# Patient Record
Sex: FEMALE | Race: WHITE | NOT HISPANIC OR LATINO | URBAN - METROPOLITAN AREA
[De-identification: names, ages, dates, MRNs, and addresses within clinical notes are randomized per-mention and may not be internally consistent; named-entity substitution may affect disease eponyms.]

---

## 2017-02-22 ENCOUNTER — OFFICE VISIT - HEALTHEAST (OUTPATIENT)
Dept: FAMILY MEDICINE | Facility: CLINIC | Age: 32
End: 2017-02-22

## 2017-02-22 DIAGNOSIS — J40 BRONCHITIS: ICD-10-CM

## 2017-02-22 RX ORDER — ALBUTEROL SULFATE 90 UG/1
2 AEROSOL, METERED RESPIRATORY (INHALATION) EVERY 4 HOURS PRN
Qty: 18 G | Refills: 1 | Status: SHIPPED | OUTPATIENT
Start: 2017-02-22 | End: 2022-05-24

## 2017-03-07 ENCOUNTER — OFFICE VISIT - HEALTHEAST (OUTPATIENT)
Dept: FAMILY MEDICINE | Facility: CLINIC | Age: 32
End: 2017-03-07

## 2017-03-07 DIAGNOSIS — B97.89 VIRAL SINUSITIS: ICD-10-CM

## 2017-03-07 DIAGNOSIS — J32.9 VIRAL SINUSITIS: ICD-10-CM

## 2017-05-11 ENCOUNTER — OFFICE VISIT - HEALTHEAST (OUTPATIENT)
Dept: INTERNAL MEDICINE | Facility: CLINIC | Age: 32
End: 2017-05-11

## 2017-05-11 DIAGNOSIS — B37.2 CUTANEOUS CANDIDIASIS: ICD-10-CM

## 2019-01-02 ENCOUNTER — OFFICE VISIT - HEALTHEAST (OUTPATIENT)
Dept: FAMILY MEDICINE | Facility: CLINIC | Age: 34
End: 2019-01-02

## 2019-01-02 DIAGNOSIS — K58.2 IRRITABLE BOWEL SYNDROME WITH BOTH CONSTIPATION AND DIARRHEA: ICD-10-CM

## 2019-01-02 DIAGNOSIS — A08.4 VIRAL GASTROENTERITIS: ICD-10-CM

## 2019-01-02 DIAGNOSIS — K21.9 GASTROESOPHAGEAL REFLUX DISEASE WITHOUT ESOPHAGITIS: ICD-10-CM

## 2019-01-02 RX ORDER — BENZONATATE 100 MG/1
100 CAPSULE ORAL 3 TIMES DAILY PRN
Qty: 30 CAPSULE | Refills: 0 | Status: SHIPPED | OUTPATIENT
Start: 2019-01-02 | End: 2022-05-24

## 2019-01-02 RX ORDER — ONDANSETRON 4 MG/1
4 TABLET, FILM COATED ORAL DAILY PRN
Qty: 30 TABLET | Refills: 1 | Status: SHIPPED | OUTPATIENT
Start: 2019-01-02 | End: 2022-05-24

## 2019-01-02 ASSESSMENT — MIFFLIN-ST. JEOR: SCORE: 1458.21

## 2021-05-30 VITALS — WEIGHT: 173.6 LBS

## 2021-05-30 VITALS — WEIGHT: 173 LBS

## 2021-06-02 VITALS — HEIGHT: 64 IN | BODY MASS INDEX: 29.59 KG/M2 | WEIGHT: 173.3 LBS

## 2021-06-09 NOTE — PROGRESS NOTES
Chief Complaint   Patient presents with     sinus drainage and pressure     x2 weeks sinus drainage pressure , ears hurt face hurts      History of Present Illness:    Mary is a 30 y/o female who is currently breastfeeding who is mainly concerned about sinus tenderness.  The patient reports about 10 day history of sinus tenderness,facial and teeth pain w/o fevers.  She denies any cough, sore throat or hoarseness.  The patient denies any recent sick contacts.  No chest pain, orthopnea, paroxysmal nocturnal dyspnea or lower extremity edema.  She has not received Influenza vaccine this year.  The patient is allergic to penicillins, Macrolides and Cephalosporins.       Review of systems: See history of present illness, otherwise negative.   Current Outpatient Prescriptions   Medication Sig Dispense Refill     albuterol (PROVENTIL HFA;VENTOLIN HFA) 90 mcg/actuation inhaler Inhale 2 puffs every 4 (four) hours as needed for wheezing (or cough). 18 g 1     inhalational spacing device Spcr Use with inhaler as needed 1 each 0     No current facility-administered medications for this visit.       Past Medical History:   Diagnosis Date     GERD (gastroesophageal reflux disease) 11/28/2016     IBS (irritable bowel syndrome) 11/28/2016      Past Surgical History:   Procedure Laterality Date     TONSILLECTOMY        Social History     Social History     Marital status: Single     Spouse name: N/A     Number of children: N/A     Years of education: N/A     Social History Main Topics     Smoking status: Current Every Day Smoker     Smokeless tobacco: Not on file     Alcohol use Not on file     Drug use: Not on file     Sexual activity: Not on file     Other Topics Concern     Not on file     Social History Narrative      History   Smoking Status     Current Every Day Smoker   Smokeless Tobacco     Not on file      Exam:   Blood pressure 112/60, pulse 88, temperature 98.3  F (36.8  C), temperature source Oral, resp. rate 16,  weight 173 lb (78.5 kg), last menstrual period 02/21/2017, SpO2 100 %, not currently breastfeeding.   General: Pleasant female in NAD.  HEENT: Atraumatic, Tympanic membranes clear. Oropharynx clear. No sinus tenderness or facial pain.  NECK: No lymphadenopathy or thyromegaly or JVD.  Respiratory: Clear to auscultation and percussion.  CVS: Regular rate and rhythm without murmur, rub, or gallop.  GI: Normal bowel sounds. Soft, nontender. No hepatosplenomegaly.  EXTREMITIES: No cyanosis, clubbing, or edema. 2+ peripheral pulses.       Assessment/Plan   1. Viral sinusitis        Patient Instructions   Discussed with the patient that his symptoms likely related to viral sinusitis which should improve with symptomatic supportive cares.  She is currently breastfeeding and is also allergic to multiple antibiotic classes(penicillins, Macrolides and Cephalosporins).    We would like to avoid antibiotics at this point and only use if symptoms persist longer or worsen over the next week.  Recommended supportive cares; nasal saline, elevating hob, humidified air.  Advised plenty of fluids and rest. Tylenol prn for fever/discomfort.  Use Albuterol prn.  If symptoms not improved in next 5-7 days, f/u with PCP, sooner if worsening.             Rosas Lew, DO  Internal Medicine

## 2021-06-09 NOTE — PROGRESS NOTES
Assessment/Plan:   Bronchitis  Four days cough, ST and a little bit of fever.  - albuterol (PROVENTIL HFA;VENTOLIN HFA) 90 mcg/actuation inhaler; Inhale 2 puffs every 4 (four) hours as needed for wheezing (or cough).  Dispense: 18 g; Refill: 1  - inhalational spacing device Spcr; Use with inhaler as needed  Dispense: 1 each; Refill: 0    Fluids, rest, steam, nasal saline if congested  Cough drops as needed  Guaifenesin as needed for cough  Albuterol inhaler as needed  Follow up if worse or no better    Subjective:      Mary Zelaya is a 31 y.o. female who presents with cough and fever.  About 4 days ago she developed stuffy nose which progressed into a cough.  Yesterday she developed fever and ST.  ST and cough both worse today.  Her son recently diagnosed with bronchitis and treated with albuterol, getting better.  No other known exposures.  No wheeze or shortness of breath.  No chills, myalgia or fatigue.  Multiple drug allergies.      Allergies   Allergen Reactions     Azithromycin      Cephalexin Monohydrate      Penicillins      No current outpatient prescriptions on file prior to visit.     No current facility-administered medications on file prior to visit.      Patient Active Problem List   Diagnosis     Tendonitis Peroneal     IBS (irritable bowel syndrome)     GERD (gastroesophageal reflux disease)       Objective:     Visit Vitals     /60     Pulse 96     Temp 98.5  F (36.9  C) (Oral)     Resp 12     Wt 173 lb 9.6 oz (78.7 kg)     SpO2 98%       Physical  General Appearance: Alert, cooperative, no distress  Head: Normocephalic, without obvious abnormality, atraumatic  Eyes: Conjunctivae are normal.  Ears: Normal TMs and external ear canals, both ears  Nose: mild congestion, no sinus tenderness.  Throat: Throat is red posteriorly.  No exudate.  No significant lesions  Neck: No adenopathy  Lungs: Clear to auscultation bilaterally, prolonged exp phase and deep breaths trigger cough,  respirations unlabored  Heart: Regular rate and rhythm  Skin:  no rashes or lesions  Psychiatric: Patient has a normal mood and affect.

## 2021-06-10 NOTE — PROGRESS NOTES
[facility] Clinic Note  Patient Name: Mary Zelaya  Patient Age: 31 y.o.  YOB: 1985  MRN: [id number]  ?  Date of Visit: 5/11/2017  Reason for Office Visit: Breast problem     HPI: Mary Zelaya 31 y.o. female who presents to clinic for right breast spots concerning for fungal infection. She is here with her 10 mo old who has thrush. Some itching and pain. No surrounding redness or warmth. Has been breastfeeding and pumping       Review of Systems: As noted in HPI     Current Scheduled Meds:  Outpatient Encounter Prescriptions as of 5/11/2017   Medication Sig Dispense Refill     albuterol (PROVENTIL HFA;VENTOLIN HFA) 90 mcg/actuation inhaler Inhale 2 puffs every 4 (four) hours as needed for wheezing (or cough). 18 g 1     clotrimazole (LOTRIMIN) 1 % cream Apply twice a day to affected area until rash resolves 30 g 0     inhalational spacing device Spcr Use with inhaler as needed 1 each 0     No facility-administered encounter medications on file as of 5/11/2017.        Objective / Physical Examination:  There were no vitals taken for this visit.  Wt Readings from Last 3 Encounters:   03/07/17 173 lb (78.5 kg)   02/22/17 173 lb 9.6 oz (78.7 kg)   11/28/16 174 lb 3.2 oz (79 kg)     There is no height or weight on file to calculate BMI. (>25?)    General Appearance: Alert and oriented in no acute distress  Integumentary: scattered erythematous spots consistent with candida around right areola    Assessment / Plan / Medical Decision Making:      Encounter Diagnoses   Name Primary?     Cutaneous candidiasis Yes        1. Cutaneous candidiasis    Rash suspicious for cutaneous candida infection. Advise clotrimazole twice daily. Pump on that side. Avoid breast feeding until rash clears.     - clotrimazole (LOTRIMIN) 1 % cream; Apply twice a day to affected area until rash resolves  Dispense: 30 g; Refill: 0    Total time spent with patient was 10 minutes with >50% of time spent in  face-to-face counseling regarding the above plan     Porfirio Vargas MD  [facility]

## 2021-06-18 NOTE — LETTER
Letter by Kath Dorantes MD at      Author: Kath Dorantes MD Service: -- Author Type: --    Filed:  Encounter Date: 1/2/2019 Status: (Other)       January 2, 2019     Patient: Mary Zelaya   YOB: 1985   Date of Visit: 1/2/2019       To Whom It May Concern:    It is my medical opinion that Mary Zelaya may return to work on 1/7/18.  If symptoms stop, can return to work    If you have any questions or concerns, please don't hesitate to call.    Sincerely,        Electronically signed by Kath Dorantes MD

## 2021-06-22 NOTE — PROGRESS NOTES
Family Medicine Office Visit  [facility]  Patient Name: Mary Zelaya  Patient Age: 33 y.o.  YOB: 1985  MRN: [id number]    Date of Visit: 2019  Reason for Office Visit:   Chief Complaint   Patient presents with     Establish Care     1 day vomiting, diarrhea           Assessment / Plan / Medical Decision Makin. Irritable bowel syndrome with both constipation and diarrhea  Diarrhea but do not recommend any bowel medications including lomotil due to hx of IBS    2. Gastroesophageal reflux disease without esophagitis  Continue with current medications    3. Viral gastroenteritis  Will do zofran for nausea and recommend continue with good PO intake.  If any worsening symtpoms, return to the clinic.      Health Maintenance Review  Health Maintenance   Topic Date Due     TD 18+ HE  2001     TDAP ADULT ONE TIME DOSE  2001     ADVANCE DIRECTIVES DISCUSSED WITH PATIENT  2001     PAP SMEAR  2013     INFLUENZA VACCINE RULE BASED (1) 2018         I have discontinued Mary Zelaya's clotrimazole. I am also having her start on ondansetron and benzonatate. Additionally, I am having her maintain her albuterol and inhalational spacing device.      HPI:  Mary Zelaya is a 33 y.o. year old who presents to the office today for diarrhea and vomiting that started today.  Vomiting and diarrhea about 5-6 times this morning.  Trying to keep water down but threw that up about 1 hour ago.  Hx of IBS and feels like that is flared up due to the diarrhea.  Prior to this illness, viral URI in December and missing a bunch of work.  No fevers or chills        Review of Systems- pertinent positive in bold:  Constitutional: Fever, chills, night sweats, fainting, weight change, fatigue, seizures, dizziness, sleeping difficulties, loud snoring/pauses in breathing  Eyes: change in vision, blurred or double vision, redness/eye  pain  Ears, nose, mouth, throat: change in hearing, ear pain, hoarseness, difficulty swallowing, sores in the mouth or throat  Respiratory: shortness of breath, cough, bloody sputum, wheezing  Cardiovascular: chest pain, palpitations   Gastrointestinal: abdominal pain, heartburn/indigestion, nausea/vomiting, change in appetite, change in bowel habits, constipation or diarrhea, rectal bleeding/dark stools, difficulty swallowing  Urinary: painful urination, frequent urination, urinary urgency/incontinence, blood in urine/dark urine, nocturia  Musculoskeletal: backache/back pain (new or increasing), weakness, joint pain/stiffness (new or increasing), muscle cramps, swelling of hands, feet, ankles, leg pain/redness  Skin: change in moles/freckles, rash, nodules  Hematologic/lymphatic: swollen lymph glands, abnormal bruising/bleeding  Endocrine: excessive thirst/urination, cold or heat intolerance  Neurologic/emotional: worrisome memory change, numbness/tingling, anxiety, mood swings      Current Scheduled Meds:  Outpatient Encounter Medications as of 1/2/2019   Medication Sig Dispense Refill     albuterol (PROVENTIL HFA;VENTOLIN HFA) 90 mcg/actuation inhaler Inhale 2 puffs every 4 (four) hours as needed for wheezing (or cough). 18 g 1     inhalational spacing device Spcr Use with inhaler as needed 1 each 0     benzonatate (TESSALON PERLES) 100 MG capsule Take 1 capsule (100 mg total) by mouth 3 (three) times a day as needed for cough. 30 capsule 0     ondansetron (ZOFRAN) 4 MG tablet Take 1 tablet (4 mg total) by mouth daily as needed for nausea. 30 tablet 1     [DISCONTINUED] clotrimazole (LOTRIMIN) 1 % cream Apply twice a day to affected area until rash resolves 30 g 0     No facility-administered encounter medications on file as of 1/2/2019.      Past Medical History:   Diagnosis Date     Anxiety      Depression      GERD (gastroesophageal reflux disease) 11/28/2016     IBS (irritable bowel syndrome) 11/28/2016  "    Past Surgical History:   Procedure Laterality Date     ADENOIDECTOMY       TONSILLECTOMY       TYMPANOSTOMY       Social History     Tobacco Use     Smoking status: Current Every Day Smoker     Packs/day: 0.50     Years: 20.00     Pack years: 10.00     Smokeless tobacco: Never Used   Substance Use Topics     Alcohol use: No     Frequency: Never     Drug use: No       Objective / Physical Examination:  Vitals:    01/02/19 0848   BP: 100/80   Patient Site: Right Arm   Patient Position: Sitting   Cuff Size: Adult Large   Pulse: 98   Resp: 26   SpO2: 97%   Weight: 173 lb 4.8 oz (78.6 kg)   Height: 5' 4.13\" (1.629 m)     Wt Readings from Last 3 Encounters:   01/02/19 173 lb 4.8 oz (78.6 kg)   03/07/17 173 lb (78.5 kg)   02/22/17 173 lb 9.6 oz (78.7 kg)     BP Readings from Last 3 Encounters:   01/02/19 100/80   03/07/17 112/60   02/22/17 112/60     Body mass index is 29.62 kg/m .     General Appearance: Alert and oriented, cooperative, affect appropriate, speech clear, in no apparent distress  Head: Normocephalic, atraumatic  Ears: Tympanic membrane clear with landmarks well visualized bilaterally  Eyes: PERRL, fundi appear clear bilaterally. EOMI. Conjunctivae clear and sclerae non-icteric  Nose: Septum midline, nares patent, no visible polyps, mucosa moist and without drainage  Throat: Lips and mucosa moist. Teeth in good repair, pharynx without erythema or exudate  Neck: Supple, trachea midline. No cervical adenopathy  Back: Symmetrical and nontender  Lungs: Clear to auscultation bilaterally. Normal inspiratory and expiratory effort  Cardiovascular: Regular rate, normal S1, S2. No murmurs, rubs, or gallops  Abdomen: Bowel sounds active all four quadrants. Soft, non-tender. No hepatomegaly or splenomegaly. No bruits detected.   Extremities: Pulses 2+ and equal throughout. No edema. Strength equal throughout.  Integumentary: Warm and dry. Without suspicious looking lesions  Neuro: Alert and oriented, follows " commands appropriately.     No orders of the defined types were placed in this encounter.  Followup: No Follow-up on file. earlier if needed.    Total time spent with patient was 30 min with >50% of time spent in face-to-face counseling regarding the above plan       Kath Dorantes MD

## 2022-05-24 ENCOUNTER — OFFICE VISIT (OUTPATIENT)
Dept: INTERNAL MEDICINE | Facility: CLINIC | Age: 37
End: 2022-05-24
Payer: COMMERCIAL

## 2022-05-24 VITALS
DIASTOLIC BLOOD PRESSURE: 84 MMHG | HEART RATE: 84 BPM | BODY MASS INDEX: 34.32 KG/M2 | OXYGEN SATURATION: 98 % | WEIGHT: 206 LBS | HEIGHT: 65 IN | SYSTOLIC BLOOD PRESSURE: 124 MMHG

## 2022-05-24 DIAGNOSIS — Z86.16 HISTORY OF COVID-19: ICD-10-CM

## 2022-05-24 DIAGNOSIS — F12.20 CANNABIS USE DISORDER, SEVERE, DEPENDENCE (H): ICD-10-CM

## 2022-05-24 DIAGNOSIS — E66.811 CLASS 1 OBESITY WITH BODY MASS INDEX (BMI) OF 34.0 TO 34.9 IN ADULT, UNSPECIFIED OBESITY TYPE, UNSPECIFIED WHETHER SERIOUS COMORBIDITY PRESENT: Primary | ICD-10-CM

## 2022-05-24 DIAGNOSIS — K31.84 GASTROPARESIS: ICD-10-CM

## 2022-05-24 DIAGNOSIS — F33.1 MODERATE EPISODE OF RECURRENT MAJOR DEPRESSIVE DISORDER (H): ICD-10-CM

## 2022-05-24 DIAGNOSIS — K21.9 GASTROESOPHAGEAL REFLUX DISEASE, UNSPECIFIED WHETHER ESOPHAGITIS PRESENT: ICD-10-CM

## 2022-05-24 DIAGNOSIS — Z00.00 ROUTINE GENERAL MEDICAL EXAMINATION AT A HEALTH CARE FACILITY: ICD-10-CM

## 2022-05-24 LAB
ALBUMIN SERPL-MCNC: 3.9 G/DL (ref 3.5–5)
ALP SERPL-CCNC: 97 U/L (ref 45–120)
ALT SERPL W P-5'-P-CCNC: 22 U/L (ref 0–45)
ANION GAP SERPL CALCULATED.3IONS-SCNC: 10 MMOL/L (ref 5–18)
AST SERPL W P-5'-P-CCNC: 23 U/L (ref 0–40)
BASOPHILS # BLD AUTO: 0 10E3/UL (ref 0–0.2)
BASOPHILS NFR BLD AUTO: 0 %
BILIRUB SERPL-MCNC: 0.3 MG/DL (ref 0–1)
BUN SERPL-MCNC: 6 MG/DL (ref 8–22)
CALCIUM SERPL-MCNC: 9.3 MG/DL (ref 8.5–10.5)
CHLORIDE BLD-SCNC: 102 MMOL/L (ref 98–107)
CHOLEST SERPL-MCNC: 307 MG/DL
CO2 SERPL-SCNC: 26 MMOL/L (ref 22–31)
CREAT SERPL-MCNC: 0.75 MG/DL (ref 0.6–1.1)
EOSINOPHIL # BLD AUTO: 0.1 10E3/UL (ref 0–0.7)
EOSINOPHIL NFR BLD AUTO: 2 %
ERYTHROCYTE [DISTWIDTH] IN BLOOD BY AUTOMATED COUNT: 12.7 % (ref 10–15)
FASTING STATUS PATIENT QL REPORTED: ABNORMAL
GFR SERPL CREATININE-BSD FRML MDRD: >90 ML/MIN/1.73M2
GLUCOSE BLD-MCNC: 90 MG/DL (ref 70–125)
HBA1C MFR BLD: 5.3 % (ref 0–5.6)
HCT VFR BLD AUTO: 38 % (ref 35–47)
HDLC SERPL-MCNC: 44 MG/DL
HGB BLD-MCNC: 12.6 G/DL (ref 11.7–15.7)
IMM GRANULOCYTES # BLD: 0 10E3/UL
IMM GRANULOCYTES NFR BLD: 0 %
LDLC SERPL CALC-MCNC: 227 MG/DL
LYMPHOCYTES # BLD AUTO: 2.6 10E3/UL (ref 0.8–5.3)
LYMPHOCYTES NFR BLD AUTO: 29 %
MCH RBC QN AUTO: 28.8 PG (ref 26.5–33)
MCHC RBC AUTO-ENTMCNC: 33.2 G/DL (ref 31.5–36.5)
MCV RBC AUTO: 87 FL (ref 78–100)
MONOCYTES # BLD AUTO: 0.6 10E3/UL (ref 0–1.3)
MONOCYTES NFR BLD AUTO: 7 %
NEUTROPHILS # BLD AUTO: 5.5 10E3/UL (ref 1.6–8.3)
NEUTROPHILS NFR BLD AUTO: 62 %
PLATELET # BLD AUTO: 277 10E3/UL (ref 150–450)
POTASSIUM BLD-SCNC: 3.6 MMOL/L (ref 3.5–5)
PROT SERPL-MCNC: 7 G/DL (ref 6–8)
RBC # BLD AUTO: 4.38 10E6/UL (ref 3.8–5.2)
SODIUM SERPL-SCNC: 138 MMOL/L (ref 136–145)
TRIGL SERPL-MCNC: 181 MG/DL
WBC # BLD AUTO: 8.9 10E3/UL (ref 4–11)

## 2022-05-24 PROCEDURE — 83036 HEMOGLOBIN GLYCOSYLATED A1C: CPT | Performed by: PEDIATRICS

## 2022-05-24 PROCEDURE — 99385 PREV VISIT NEW AGE 18-39: CPT | Performed by: PEDIATRICS

## 2022-05-24 PROCEDURE — 85025 COMPLETE CBC W/AUTO DIFF WBC: CPT | Performed by: PEDIATRICS

## 2022-05-24 PROCEDURE — 36415 COLL VENOUS BLD VENIPUNCTURE: CPT | Performed by: PEDIATRICS

## 2022-05-24 PROCEDURE — 80053 COMPREHEN METABOLIC PANEL: CPT | Performed by: PEDIATRICS

## 2022-05-24 PROCEDURE — 80061 LIPID PANEL: CPT | Performed by: PEDIATRICS

## 2022-05-24 PROCEDURE — 99214 OFFICE O/P EST MOD 30 MIN: CPT | Mod: 25 | Performed by: PEDIATRICS

## 2022-05-24 RX ORDER — FLUOXETINE 40 MG/1
40 CAPSULE ORAL DAILY
Qty: 90 CAPSULE | Refills: 1 | Status: SHIPPED | OUTPATIENT
Start: 2022-05-24 | End: 2022-08-31

## 2022-05-24 RX ORDER — METOCLOPRAMIDE 10 MG/1
TABLET ORAL
COMMUNITY
Start: 2022-05-01 | End: 2023-08-07

## 2022-05-24 RX ORDER — FLUOXETINE 40 MG/1
1 CAPSULE ORAL DAILY
COMMUNITY
Start: 2021-02-01 | End: 2022-05-24

## 2022-05-24 ASSESSMENT — ANXIETY QUESTIONNAIRES
3. WORRYING TOO MUCH ABOUT DIFFERENT THINGS: SEVERAL DAYS
6. BECOMING EASILY ANNOYED OR IRRITABLE: SEVERAL DAYS
5. BEING SO RESTLESS THAT IT IS HARD TO SIT STILL: NOT AT ALL
GAD7 TOTAL SCORE: 3
7. FEELING AFRAID AS IF SOMETHING AWFUL MIGHT HAPPEN: NOT AT ALL
IF YOU CHECKED OFF ANY PROBLEMS ON THIS QUESTIONNAIRE, HOW DIFFICULT HAVE THESE PROBLEMS MADE IT FOR YOU TO DO YOUR WORK, TAKE CARE OF THINGS AT HOME, OR GET ALONG WITH OTHER PEOPLE: NOT DIFFICULT AT ALL
2. NOT BEING ABLE TO STOP OR CONTROL WORRYING: NOT AT ALL
GAD7 TOTAL SCORE: 3
1. FEELING NERVOUS, ANXIOUS, OR ON EDGE: SEVERAL DAYS

## 2022-05-24 ASSESSMENT — ENCOUNTER SYMPTOMS
HEADACHES: 1
SORE THROAT: 0
FEVER: 0
EYE PAIN: 0
DIARRHEA: 0
ARTHRALGIAS: 0
NERVOUS/ANXIOUS: 0
DIZZINESS: 0
PARESTHESIAS: 0
HEMATURIA: 0
FREQUENCY: 0
CONSTIPATION: 0
WEAKNESS: 0
HEARTBURN: 1
COUGH: 0
SHORTNESS OF BREATH: 0
PALPITATIONS: 0
DYSURIA: 0
JOINT SWELLING: 0
CHILLS: 0
BREAST MASS: 0
HEMATOCHEZIA: 0
ABDOMINAL PAIN: 1
MYALGIAS: 0
NAUSEA: 1

## 2022-05-24 ASSESSMENT — PATIENT HEALTH QUESTIONNAIRE - PHQ9
SUM OF ALL RESPONSES TO PHQ QUESTIONS 1-9: 9
5. POOR APPETITE OR OVEREATING: NOT AT ALL

## 2022-05-24 NOTE — PATIENT INSTRUCTIONS
I will be leaving [facility] on June 29, 2022. I recommend that you establish with a new physician. If you wish to stay with [facility], you can establish with Dr. Torres or Dr. Pisano (both family medicine).     Preventive Health Recommendations  Female Ages 26 - 39  Yearly exam:   See your health care provider every year in order to  Review health changes.   Discuss preventive care.    Review your medicines if you your doctor has prescribed any.    Until age 30: Get a Pap test every three years (more often if you have had an abnormal result).    After age 30: Talk to your doctor about whether you should have a Pap test every 3 years or have a Pap test with HPV screening every 5 years.   You do not need a Pap test if your uterus was removed (hysterectomy) and you have not had cancer.  You should be tested each year for STDs (sexually transmitted diseases), if you're at risk.   Talk to your provider about how often to have your cholesterol checked.  If you are at risk for diabetes, you should have a diabetes test (fasting glucose).  Shots: Get a flu shot each year. Get a tetanus shot every 10 years.   Nutrition:   Eat at least 5 servings of fruits and vegetables each day.  Eat whole-grain bread, whole-wheat pasta and brown rice instead of white grains and rice.  Get adequate Calcium and Vitamin D.     Lifestyle  Exercise at least 150 minutes a week (30 minutes a day, 5 days of the week). This will help you control your weight and prevent disease.  Limit alcohol to one drink per day.  No smoking.   Wear sunscreen to prevent skin cancer.  See your dentist every six months for an exam and cleaning.

## 2022-05-24 NOTE — COMMUNITY RESOURCES LIST (ENGLISH)
05/24/2022   [facility]  Josefina Fu  For questions about this resource list or additional care needs, please contact your primary care clinic or care manager.  Phone: [phone]   Email: N/A   Address: [address] [place] [address]   Hours: N/A        Hotlines and Helplines       Hotline - Crisis help  1  [facility] Crisis Line Distance: 2.11 miles      COVID-19 Status: Phone/Virtual   [address] [place] [address]  Language: English, Citizen of [place]  Hours: Mon - Sun Open 24 Hours   Phone: (530) 449-7394 Email: info@[employer].org Website: http://www.[employer].org     2  [place] Department of Human Services - Crisis Text Line - Crisis Text Line Distance: 3.38 miles      COVID-19 Status: Phone/Virtual   [address] [place] [address]  Language: English  Hours: Mon - Sun Open 24 Hours   Website: https://[place].gov/dhs/partners-and-providers/policies-procedures/adult-mental-health/crisis-text-line/          Mental Health       Individual counseling  3  [facility] Distance: 0.79 miles      COVID-19 Status: Service Unavailable   [address] [place] [address]  Language: English  Hours: Mon - Fri 8:00 AM - 5:00 PM  Fees: Insurance, Self Pay   Phone: (698) 892-1410 Website: https://[facility].org/locations/l-lmcyro-poauhdax-[facility]-[address] [facility] Distance: 1.8 miles      COVID-19 Status: Regular Operations, COVID-19 Status: Phone/Virtual   [address] [place] [address]  Language: English  Hours: Mon 7:00 AM - 7:00 PM , Tue 7:00 AM - 5:00 PM , Wed - Thu 7:00 AM - 7:00 PM , Fri 7:00 AM - 5:00 PM , Sat 8:00 AM - 12:00 PM  Fees: Insurance, Self Pay   Phone: (366) 758-1095 Website: https://www.[employer].com/care/find/location/primary-care-clinics/[facility]/[place]/     Mental health crisis care  5  [facility] Adult [facility]gent [facility] -  Adult Program Distance: 3.4 miles      COVID-19 Status: Regular Operations   [address] [place] [address]  Language: English, Hmong, Kazakh  Hours: Mon - Fri 8:00 AM - 5:30 PM  Fees: Insurance, Self Pay, Sliding Fee   Phone: (342) 129-3947 Website: https://www.[facility]./residents/health-medical/clinics-services/mental-behavorial-health/adult-mental-health-chemical-health/urgent-care-adult-mental-health     6  [employer] - Crisis Stabilization Services ([facility]) Distance: 6.15 miles      COVID-19 Status: Regular Operations   [address] [place] [address]  Language: English, Zambian  Hours: Mon - Sun Open 24 Hours  Fees: Insurance   Phone: (987) 188-8384 Email: info@[employer] Website: https://[employer]/services-programs/residential-services/husam-micki-house/     Mental health support group  7  [facility] Distance: 3.98 miles      COVID-19 Status: Phone/Virtual   [address] [place] [address]  Language: English  Hours: Mon - Fri 8:00 AM - 4:30 PM  Fees: Free   Phone: (951) 277-2933 Website: https://www.[place].gov/find-locations/facility/vc_0416V     8  [facility] Distance: 4.75 miles      COVID-19 Status: Phone/Virtual   [address] [place] [address]  Language: English  Hours: Mon - Thu 7:00 AM - 8:00 PM , Fri 7:00 AM - 5:00 PM  Fees: Insurance, Self Pay   Phone: (677) 524-6650 Email: [email or web address] Website: [email or web address]://www.[employer]/our-locations/[place]/[facility]/          Important Numbers & Websites       Emergency Services   911  City Services   311  Poison Control   (335) 685-2637  Suicide Prevention Lifeline   (140) 355-8852 (TALK)  Child Abuse Hotline   (342) 362-6490 (4-A-Child)  Sexual Assault Hotline   (583) 943-5502 (HOPE)  National Runaway Safeline   (783) 850-6077 (RUNAWAY)  All-Options Talkline   (263)  337-3256  Substance Abuse Referral   (838) 480-3754 (HELP)

## 2022-05-24 NOTE — PROGRESS NOTES
SUBJECTIVE:   CC: Mary Zelaya is an 36 year old woman who presents for preventive health visit.       Patient has been advised of split billing requirements and indicates understanding: Yes  Healthy Habits:     Getting at least 3 servings of Calcium per day:  NO    Bi-annual eye exam:  NO    Dental care twice a year:  NO    Sleep apnea or symptoms of sleep apnea:  None    Diet:  Other    Frequency of exercise:  None    Taking medications regularly:  Yes    Medication side effects:  None    PHQ-2 Total Score: 2    Additional concerns today:  No     She is here for her physical as well as per medication refills.    Unfortunately, her last provider is no longer accepting her insurance.    She has had depression for many years now.  She is on fluoxetine 40 mg daily.  Her PCP did increase the dose a couple of years ago and she has been doing very well on it since then.    She does have reflux and gastroparesis.  She was previously seeing a gastroenterologist within the [facility].  However due to her insurance, she has switched over to [facility].  She has an appointment with them next week.    Recently she was seen in the urgency room for her gastroparesis.  She was started on Reglan and that seems to be very effective.    She is on omeprazole 20 mg daily.  Is getting very expensive for her to pay out-of-pocket, however her insurance refuses to cover it.  She also did see a dietitian for her gastroparesis.    She works as a .  She has a very sedentary job.  She does not exercise much.  She has 2 boys, ages 6 and 9.  She is .  She has had a tubal ligation    She previously smoked a pack a day for about 20 years, quit in 2020.  She used Chantix.  She was motivated to quit due to her kids.    She is smoking marijuana every single day.    Diet wise- eats a lot of carbohydrates    She did have COVID in January.  She refuses the COVID-vaccine.  Her only symptom was a headache  "and worsening of her gastroparesis.      Today's PHQ-2 Score:   PHQ-2 ( 1999 Pfizer) 5/24/2022   Q1: Little interest or pleasure in doing things 0   Q2: Feeling down, depressed or hopeless 0   PHQ-2 Score 0   Q1: Little interest or pleasure in doing things Several days   Q2: Feeling down, depressed or hopeless Several days   PHQ-2 Score 2       Abuse: Current or Past (Physical, Sexual or Emotional) - No  Do you feel safe in your environment? Yes        Social History     Tobacco Use     Smoking status: Former Smoker     Packs/day: 1.00     Years: 20.00     Pack years: 20.00     Smokeless tobacco: Never Used     Tobacco comment: Quit in 2020   Substance Use Topics     Alcohol use: No     If you drink alcohol do you typically have >3 drinks per day or >7 drinks per week? No    Alcohol Use 5/24/2022   Prescreen: >3 drinks/day or >7 drinks/week? No   Prescreen: >3 drinks/day or >7 drinks/week? -       Reviewed orders with patient.  Reviewed health maintenance and updated orders accordingly - Yes      Breast Cancer Screening:    Breast CA Risk Assessment (FHS-7) 5/24/2022   Do you have a family history of breast, colon, or ovarian cancer? No / Unknown           Pertinent mammograms are reviewed under the imaging tab.    History of abnormal Pap smear: NO - age 30-65 PAP every 5 years with negative HPV co-testing recommended     Reviewed and updated as needed this visit by clinical staff   Tobacco  Allergies  Meds     Fam Hx  Soc Hx          Reviewed and updated as needed this visit by Provider   Tobacco       Fam Hx  Soc Hx             Review of Systems  See above     OBJECTIVE:   /84 (BP Location: Right arm, Patient Position: Sitting)   Pulse 84   Ht 5' 4.5\" (1.638 m)   Wt 206 lb (93.4 kg)   SpO2 98%   BMI 34.81 kg/m    Physical Exam  GENERAL: healthy, alert and no distress  EYES: Eyes grossly normal to inspection, PERRL and conjunctivae and sclerae normal  HENT: ear canals and TM's normal, nose and " mouth without ulcers or lesions  NECK: no adenopathy, no asymmetry, masses, or scars and thyroid normal to palpation  RESP: lungs clear to auscultation - no rales, rhonchi or wheezes  BREAST: normal without masses, tenderness or nipple discharge and no palpable axillary masses or adenopathy  CV: regular rate and rhythm, normal S1 S2, no S3 or S4, no murmur, click or rub, no peripheral edema and peripheral pulses strong  ABDOMEN: soft, nontender, no hepatosplenomegaly, no masses and bowel sounds normal  MS: no gross musculoskeletal defects noted, no edema  SKIN: no suspicious lesions or rashes  NEURO: Normal strength and tone, mentation intact and speech normal  PSYCH: mentation appears normal, affect normal/bright      ASSESSMENT/PLAN:     Assessment & Plan   Problem List Items Addressed This Visit     GERD (gastroesophageal reflux disease)     She was seen in August of last year by gastroenterology.  She has been diagnosed with GERD and gastroparesis.  She is on omeprazole daily and previously she had been trying to control her gastroparesis via diet.  She had met with a dietitian to help with gastroparesis.  Recently she was seen in the urgent care setting and given Reglan.  Per the last note by GI, they had wanted to hold off on this due to potential side effects.  I did discuss with her side effects of Reglan.  She is not interested in a refill at this time.  At this time she does have an appointment already with [facility] next week.  They can continue to discuss with her what the best treatment course would be.  She is interested in meeting with a dietitian.  Referral has been placed           Relevant Medications    omeprazole (PRILOSEC) 20 MG DR capsule    metoclopramide (REGLAN) 10 MG tablet    Cannabis use disorder, severe, dependence (H)     She is smoking marijuana every day.  I did discuss with her that marijuana can cause hyperemesis syndrome.  I did encourage her to cut back on this to see if that  might help and this is something that she should discuss with her gastroenterologist next week.           Moderate episode of recurrent major depressive disorder (H)     PHQ-9 score of 9 today.  No suicidal ideation.  She reports she was diagnosed with depression many years ago.  She has been on fluoxetine for many years, and the latest dose increase was a couple of years ago when her dose was increased to 40 mg daily.  She feels that she is doing very well on this dose and does not want to make any changes.  She does not have a therapist.  She would like a refill today.           Relevant Medications    FLUoxetine (PROZAC) 40 MG capsule    Gastroparesis     She was seen in August of last year by gastroenterology.  She has been diagnosed with GERD and gastroparesis.  She is on omeprazole daily and previously she had been trying to control her gastroparesis via diet.  She had met with a dietitian to help with gastroparesis.  Recently she was seen in the urgent care setting and given Reglan.  Per the last note by GI, they had wanted to hold off on this due to potential side effects.  I did discuss with her side effects of Reglan.  She is not interested in a refill at this time.  At this time she does have an appointment already with [facility] next week.  They can continue to discuss with her what the best treatment course would be.  She is interested in meeting with a dietitian.  Referral has been placed             Relevant Orders    Nutrition Referral    History of COVID-19 in Jan 2022     She had very mild symptoms when she developed COVID in January 2022.  She continues to refuse the COVID-vaccine           Class 1 obesity with body mass index (BMI) of 34.0 to 34.9 in adult, unspecified obesity type, unspecified whether serious comorbidity present - Primary     Discussed healthy lifestyle modification such as getting regular physical activity as well as eating healthy.           Relevant Orders    Lipid panel  "reflex to direct LDL Non-fasting (Completed)    Hemoglobin A1c (Completed)    Comprehensive metabolic panel (BMP + Alb, Alk Phos, ALT, AST, Total. Bili, TP) (Completed)    CBC with platelets and differential (Completed)      Other Visit Diagnoses     Routine general medical examination at a health care facility               Return in about 4 months (around 9/24/2022) for establish care.    I will be leaving [place] June 29, 2022, so I did advise patient to establish with a new PCP by September 2022 at the latest.    Chepe Duran MD  [facility]        COUNSELING:  Reviewed preventive health counseling, as reflected in patient instructions    Estimated body mass index is 34.81 kg/m  as calculated from the following:    Height as of this encounter: 5' 4.5\" (1.638 m).    Weight as of this encounter: 206 lb (93.4 kg).    Weight management plan: referred to dietician, discussed modifications to lifestyle    She reports that she has quit smoking. She has a 20.00 pack-year smoking history. She has never used smokeless tobacco.        Counseling Resources:  ATP IV Guidelines  Pooled Cohorts Equation Calculator  Breast Cancer Risk Calculator  BRCA-Related Cancer Risk Assessment: FHS-7 Tool  FRAX Risk Assessment  ICSI Preventive Guidelines  Dietary Guidelines for Americans, 2010  USDA's MyPlate  ASA Prophylaxis  Lung CA Screening    Chepe Duran MD  [facility]  "

## 2022-05-25 PROBLEM — Z86.16 HISTORY OF COVID-19: Status: ACTIVE | Noted: 2022-05-25

## 2022-05-25 PROBLEM — K31.84 GASTROPARESIS: Status: ACTIVE | Noted: 2022-05-25

## 2022-05-25 PROBLEM — E66.811 CLASS 1 OBESITY WITH BODY MASS INDEX (BMI) OF 34.0 TO 34.9 IN ADULT, UNSPECIFIED OBESITY TYPE, UNSPECIFIED WHETHER SERIOUS COMORBIDITY PRESENT: Status: ACTIVE | Noted: 2022-05-25

## 2022-05-25 NOTE — ASSESSMENT & PLAN NOTE
Discussed healthy lifestyle modification such as getting regular physical activity as well as eating healthy.

## 2022-05-25 NOTE — ASSESSMENT & PLAN NOTE
She is smoking marijuana every day.  I did discuss with her that marijuana can cause hyperemesis syndrome.  I did encourage her to cut back on this to see if that might help and this is something that she should discuss with her gastroenterologist next week.

## 2022-05-25 NOTE — ASSESSMENT & PLAN NOTE
She was seen in August of last year by gastroenterology.  She has been diagnosed with GERD and gastroparesis.  She is on omeprazole daily and previously she had been trying to control her gastroparesis via diet.  She had met with a dietitian to help with gastroparesis.  Recently she was seen in the urgent care setting and given Reglan.  Per the last note by GI, they had wanted to hold off on this due to potential side effects.  I did discuss with her side effects of Reglan.  She is not interested in a refill at this time.  At this time she does have an appointment already with [facility] next week.  They can continue to discuss with her what the best treatment course would be.  She is interested in meeting with a dietitian.  Referral has been placed

## 2022-05-25 NOTE — ASSESSMENT & PLAN NOTE
She had very mild symptoms when she developed COVID in January 2022.  She continues to refuse the COVID-vaccine

## 2022-05-25 NOTE — ASSESSMENT & PLAN NOTE
PHQ-9 score of 9 today.  No suicidal ideation.  She reports she was diagnosed with depression many years ago.  She has been on fluoxetine for many years, and the latest dose increase was a couple of years ago when her dose was increased to 40 mg daily.  She feels that she is doing very well on this dose and does not want to make any changes.  She does not have a therapist.  She would like a refill today.

## 2022-05-30 NOTE — RESULT ENCOUNTER NOTE
Hi,    Could you let this patient know that her cholesterol levels are very high? Her LDL (bad cholesterol) is 227. Given her family history of heart disease and stroke, I strongly recommend she start a cholesterol lowering medication such as a statin. We briefly discussed this during our visit. While most people tolerate statins well, there are some potential side effects, mainly muscle pain, aches, or weakness. Very rarely it can cause more serious muscle injury. If she were to develop these symptoms, she would let me know and we would then talk about the next steps in that situation. There have also been rare episodes of liver injury and cognitive impairment associated with statins.     If she is ok with statin, it would be ok for her to be on it given she has reliable birth control. If she is ok to start, will send Rx in and then she should follow up with new PCP (needs to establish care anyways) in 3 months to repeat her cholesterol levels. Let me know what she'd like to do.    Otherwise her other labs (blood counts, diabetes screening test, electrolytes, and kidney function) all look good.     If unable to reach, send letter with similar message above.    Thank you!  Dr. Quintana

## 2022-08-27 DIAGNOSIS — E78.2 MIXED HYPERLIPIDEMIA: ICD-10-CM

## 2022-08-28 ENCOUNTER — TELEPHONE (OUTPATIENT)
Dept: NURSING | Facility: CLINIC | Age: 37
End: 2022-08-28

## 2022-08-28 ENCOUNTER — TELEPHONE (OUTPATIENT)
Dept: INTERNAL MEDICINE | Facility: CLINIC | Age: 37
End: 2022-08-28

## 2022-08-28 DIAGNOSIS — E78.2 MIXED HYPERLIPIDEMIA: Primary | ICD-10-CM

## 2022-08-28 RX ORDER — ATORVASTATIN CALCIUM 10 MG/1
TABLET, FILM COATED ORAL
Qty: 90 TABLET | Refills: 0 | Status: SHIPPED | OUTPATIENT
Start: 2022-08-28 | End: 2022-08-31

## 2022-08-28 NOTE — TELEPHONE ENCOUNTER
Called and spoke with pt re: prescription refill request for Lipitor.    Pt was started on Lipitor at the end of May by Dr. Duran for hypercholesterolemia and a strong family hx of heart disease and stroke. Rx passed refill protocol and was sent to pharmacy, however pt needs to schedule follow up appointment with a provider to recheck her cholesterol levels, per her last visit note.     Pt agreeable to making follow-up appointment. Transferred to scheduling.     Katie Becker, RN, BSN  [facility]   Triage Nurse Advisor

## 2022-08-28 NOTE — TELEPHONE ENCOUNTER
Reason for Call:  Other appointment    Detailed comments:Pt needs to be seen for  F/U and medication management. The re are no available appts until end of September and pt. Request to be seen the first week in September.                                                                                                                                                                                                          Phone Number Patient can be reached at: Cell number on file:    Telephone Information:   Mobile [phone]       Best Time: Any    Can we leave a detailed message on this number? YES    Call taken on 8/28/2022 at 12:32 PM by Luiza Mckenna

## 2022-08-28 NOTE — TELEPHONE ENCOUNTER
"Last Written Prescription Date:  5/31/22  Last Fill Quantity: 90,  # refills: 0   Last office visit provider:  DENIS Duran MD    Requested Prescriptions   Pending Prescriptions Disp Refills     atorvastatin (LIPITOR) 10 MG tablet [Pharmacy Med Name: ATORVASTATIN 10MG TABLETS] 90 tablet 0     Sig: TAKE 1 TABLET(10 MG) BY MOUTH DAILY       Statins Protocol Passed - 8/27/2022  6:09 AM        Passed - LDL on file in past 12 months     Recent Labs   Lab Test 05/24/22  1530   *             Passed - No abnormal creatine kinase in past 12 months     No lab results found.             Passed - Recent (12 mo) or future (30 days) visit within the authorizing provider's specialty     Patient has had an office visit with the authorizing provider or a provider within the authorizing providers department within the previous 12 mos or has a future within next 30 days. See \"Patient Info\" tab in inbasket, or \"Choose Columns\" in Meds & Orders section of the refill encounter.              Passed - Medication is active on med list        Passed - Patient is age 18 or older        Passed - No active pregnancy on record        Passed - No positive pregnancy test in past 12 months             Katie Becker RN 08/28/22 7:01 AM  "

## 2022-08-29 NOTE — TELEPHONE ENCOUNTER
Contacted patient and scheduled her with Palma Armijo on 8/31/22 and lab only for 8/30.  Reviewed fasting outline with patient who verbalized understanding.  Lab order placed.

## 2022-08-30 ENCOUNTER — LAB (OUTPATIENT)
Dept: LAB | Facility: CLINIC | Age: 37
End: 2022-08-30
Payer: COMMERCIAL

## 2022-08-30 DIAGNOSIS — E78.2 MIXED HYPERLIPIDEMIA: ICD-10-CM

## 2022-08-30 LAB
CHOLEST SERPL-MCNC: 186 MG/DL
HDLC SERPL-MCNC: 39 MG/DL
LDLC SERPL CALC-MCNC: 121 MG/DL
NONHDLC SERPL-MCNC: 147 MG/DL
TRIGL SERPL-MCNC: 129 MG/DL

## 2022-08-30 PROCEDURE — 80061 LIPID PANEL: CPT

## 2022-08-30 PROCEDURE — 36415 COLL VENOUS BLD VENIPUNCTURE: CPT

## 2022-08-31 ENCOUNTER — VIRTUAL VISIT (OUTPATIENT)
Dept: INTERNAL MEDICINE | Facility: CLINIC | Age: 37
End: 2022-08-31
Payer: COMMERCIAL

## 2022-08-31 DIAGNOSIS — E78.2 MIXED HYPERLIPIDEMIA: Primary | ICD-10-CM

## 2022-08-31 DIAGNOSIS — F33.1 MODERATE EPISODE OF RECURRENT MAJOR DEPRESSIVE DISORDER (H): ICD-10-CM

## 2022-08-31 PROCEDURE — 99213 OFFICE O/P EST LOW 20 MIN: CPT | Mod: 95 | Performed by: NURSE PRACTITIONER

## 2022-08-31 RX ORDER — FLUOXETINE 40 MG/1
40 CAPSULE ORAL DAILY
Qty: 90 CAPSULE | Refills: 1 | Status: SHIPPED | OUTPATIENT
Start: 2022-08-31 | End: 2023-04-14

## 2022-08-31 RX ORDER — ATORVASTATIN CALCIUM 10 MG/1
10 TABLET, FILM COATED ORAL DAILY
Qty: 90 TABLET | Refills: 3 | Status: SHIPPED | OUTPATIENT
Start: 2022-08-31 | End: 2023-08-07

## 2022-08-31 ASSESSMENT — PATIENT HEALTH QUESTIONNAIRE - PHQ9: SUM OF ALL RESPONSES TO PHQ QUESTIONS 1-9: 1

## 2022-08-31 NOTE — ASSESSMENT & PLAN NOTE
Lipid profile has greatly improved with the addition of atorvastatin 10 mg daily.  LDL goal less than 130.  She is advised to continue with atorvastatin 10 mg daily.  Hepatic profile was added to her labs, I will notify her of the results either by letter if normal or with a phone call if abnormal.  Follow-up with a new PCP and establish care within the next 1 year for additional refills.

## 2022-08-31 NOTE — PROGRESS NOTES
Mary is a 37 year old who is being evaluated via a billable video visit.      How would you like to obtain your AVS? MyChart  If the video visit is dropped, the invitation should be resent by: Text to cell phone: [phone]  Will anyone else be joining your video visit? No        Assessment & Plan   Problem List Items Addressed This Visit        Endocrine    Mixed hyperlipidemia - Primary     Lipid profile has greatly improved with the addition of atorvastatin 10 mg daily.  LDL goal less than 130.  She is advised to continue with atorvastatin 10 mg daily.  Hepatic profile was added to her labs, I will notify her of the results either by letter if normal or with a phone call if abnormal.  Follow-up with a new PCP and establish care within the next 1 year for additional refills.           Relevant Medications    atorvastatin (LIPITOR) 10 MG tablet    Other Relevant Orders    Hepatic panel (Albumin, ALT, AST, Bili, Alk Phos, TP)       Behavioral    Moderate episode of recurrent major depressive disorder (H)     Stable with current medication, fluoxetine 40 mg daily.  She is advised to follow-up and establish care with a new primary care provider in 6 months regarding this prescription.           Relevant Medications    FLUoxetine (PROZAC) 40 MG capsule             No follow-ups on file.    Palma Armijo NP  [facility]   Mary is a 37 year old, presenting for the following health issues:  Lipids (Would like to discuss labs. )      HPI   She recently started atorvastatin 10 mg daily due to markedly elevated LDL of 227.  She is tolerating medication well.  She does notice some constipation but states that this is manageable and does not have any concerns.  It has not caused any abdominal discomfort.  She denies myalgias associated with the medication.    Hyperlipidemia Follow-Up      Are you regularly taking any medication or supplement to lower your cholesterol?   Yes    Are you  having muscle aches or other side effects that you think could be caused by your cholesterol lowering medication?  Yes- Constipation      How many servings of fruits and vegetables do you eat daily?  0-1    On average, how many sweetened beverages do you drink each day (Examples: soda, juice, sweet tea, etc.  Do NOT count diet or artificially sweetened beverages)?   0    How many days per week do you exercise enough to make your heart beat faster? 3 or less    How many minutes a day do you exercise enough to make your heart beat faster? 9 or less    How many days per week do you miss taking your medication? 0    PHQ 5/24/2022 8/31/2022   PHQ-9 Total Score 9 1   Q9: Thoughts of better off dead/self-harm past 2 weeks Not at all Not at all             Objective           Vitals:  No vitals were obtained today due to virtual visit.    Physical Exam   GENERAL: Healthy, alert and no distress  EYES: Eyes grossly normal to inspection.  No discharge or erythema, or obvious scleral/conjunctival abnormalities.  RESP: No audible wheeze, cough, or visible cyanosis.  No visible retractions or increased work of breathing.    NEURO: Cranial nerves grossly intact.  Mentation and speech appropriate for age.  PSYCH: Mentation appears normal, affect normal/bright, judgement and insight intact, normal speech and appearance well-groomed.            Video-Visit Details    Video Start Time: 4:45PM    Type of service:  Video Visit    Video End Time:4:58 PM    Originating Location (pt. Location): Home    Distant Location (provider location):  [facility]     Platform used for Video Visit: [employer]

## 2022-08-31 NOTE — ASSESSMENT & PLAN NOTE
Stable with current medication, fluoxetine 40 mg daily.  She is advised to follow-up and establish care with a new primary care provider in 6 months regarding this prescription.

## 2023-01-23 ENCOUNTER — MEDICAL CORRESPONDENCE (OUTPATIENT)
Dept: HEALTH INFORMATION MANAGEMENT | Facility: CLINIC | Age: 38
End: 2023-01-23

## 2023-02-21 ENCOUNTER — HOSPITAL ENCOUNTER (OUTPATIENT)
Dept: CT IMAGING | Facility: HOSPITAL | Age: 38
Discharge: HOME | End: 2023-02-21
Attending: PHYSICIAN ASSISTANT | Admitting: PHYSICIAN ASSISTANT
Payer: COMMERCIAL

## 2023-02-21 ENCOUNTER — TRANSCRIBE ORDERS (OUTPATIENT)
Dept: CARDIOLOGY | Facility: HOSPITAL | Age: 38
End: 2023-02-21
Payer: COMMERCIAL

## 2023-02-21 DIAGNOSIS — K59.00 CONSTIPATION, UNSPECIFIED CONSTIPATION TYPE: ICD-10-CM

## 2023-02-21 DIAGNOSIS — R10.11 RUQ PAIN: ICD-10-CM

## 2023-02-21 DIAGNOSIS — Z00.00 ENCOUNTER FOR PREVENTIVE CARE: Primary | ICD-10-CM

## 2023-02-21 DIAGNOSIS — K31.84 GASTROPARESIS: ICD-10-CM

## 2023-02-21 PROCEDURE — 250N000011 HC RX IP 250 OP 636: Performed by: PHYSICIAN ASSISTANT

## 2023-02-21 PROCEDURE — 74177 CT ABD & PELVIS W/CONTRAST: CPT

## 2023-02-21 RX ORDER — IOPAMIDOL 755 MG/ML
100 INJECTION, SOLUTION INTRAVASCULAR ONCE
Status: COMPLETED | OUTPATIENT
Start: 2023-02-21 | End: 2023-02-21

## 2023-02-21 RX ADMIN — IOPAMIDOL 100 ML: 755 INJECTION, SOLUTION INTRAVENOUS at 14:31

## 2023-03-20 ENCOUNTER — HOSPITAL ENCOUNTER (OUTPATIENT)
Dept: CARDIOLOGY | Facility: HOSPITAL | Age: 38
Discharge: HOME | End: 2023-03-20
Attending: PHYSICIAN ASSISTANT | Admitting: PHYSICIAN ASSISTANT
Payer: COMMERCIAL

## 2023-03-20 PROCEDURE — 93005 ELECTROCARDIOGRAM TRACING: CPT

## 2023-03-21 LAB
ATRIAL RATE - MUSE: 77 BPM
DIASTOLIC BLOOD PRESSURE - MUSE: NORMAL MMHG
INTERPRETATION ECG - MUSE: NORMAL
P AXIS - MUSE: 49 DEGREES
PR INTERVAL - MUSE: 128 MS
QRS DURATION - MUSE: 80 MS
QT - MUSE: 376 MS
QTC - MUSE: 425 MS
R AXIS - MUSE: 73 DEGREES
SYSTOLIC BLOOD PRESSURE - MUSE: NORMAL MMHG
T AXIS - MUSE: 28 DEGREES
VENTRICULAR RATE- MUSE: 77 BPM

## 2023-03-21 PROCEDURE — 93010 ELECTROCARDIOGRAM REPORT: CPT | Performed by: INTERNAL MEDICINE

## 2023-04-14 ENCOUNTER — VIRTUAL VISIT (OUTPATIENT)
Dept: INTERNAL MEDICINE | Facility: CLINIC | Age: 38
End: 2023-04-14
Payer: COMMERCIAL

## 2023-04-14 DIAGNOSIS — F33.1 MODERATE EPISODE OF RECURRENT MAJOR DEPRESSIVE DISORDER (H): ICD-10-CM

## 2023-04-14 DIAGNOSIS — K31.84 GASTROPARESIS: Primary | ICD-10-CM

## 2023-04-14 PROCEDURE — 99441 PR PHYSICIAN TELEPHONE EVALUATION 5-10 MIN: CPT | Mod: 93 | Performed by: NURSE PRACTITIONER

## 2023-04-14 RX ORDER — FLUOXETINE 40 MG/1
40 CAPSULE ORAL DAILY
Qty: 90 CAPSULE | Refills: 1 | Status: SHIPPED | OUTPATIENT
Start: 2023-04-14 | End: 2023-08-07

## 2023-04-14 ASSESSMENT — PATIENT HEALTH QUESTIONNAIRE - PHQ9
5. POOR APPETITE OR OVEREATING: NOT AT ALL
SUM OF ALL RESPONSES TO PHQ QUESTIONS 1-9: 7

## 2023-04-14 ASSESSMENT — ANXIETY QUESTIONNAIRES
IF YOU CHECKED OFF ANY PROBLEMS ON THIS QUESTIONNAIRE, HOW DIFFICULT HAVE THESE PROBLEMS MADE IT FOR YOU TO DO YOUR WORK, TAKE CARE OF THINGS AT HOME, OR GET ALONG WITH OTHER PEOPLE: NOT DIFFICULT AT ALL
5. BEING SO RESTLESS THAT IT IS HARD TO SIT STILL: NOT AT ALL
GAD7 TOTAL SCORE: 3
6. BECOMING EASILY ANNOYED OR IRRITABLE: SEVERAL DAYS
3. WORRYING TOO MUCH ABOUT DIFFERENT THINGS: SEVERAL DAYS
2. NOT BEING ABLE TO STOP OR CONTROL WORRYING: SEVERAL DAYS
GAD7 TOTAL SCORE: 3
1. FEELING NERVOUS, ANXIOUS, OR ON EDGE: NOT AT ALL
7. FEELING AFRAID AS IF SOMETHING AWFUL MIGHT HAPPEN: NOT AT ALL

## 2023-04-14 NOTE — LETTER
My Depression Action Plan  Name: Mary Zelaya   Date of Birth 1985  Date: 4/14/2023    My doctor: Rojelio Naranjo   My clinic: [address] [address]  [phone]            GREEN    ZONE   Good Control    What it looks like:   Things are going generally well. You have normal ups and downs. You may even feel depressed from time to time, but bad moods usually last less than a day.   What you need to do:  Continue to care for yourself (see self care plan)  Check your depression survival kit and update it as needed  Follow your physician s recommendations including any medication.  Do not stop taking medication unless you consult with your physician first.             YELLOW         ZONE Getting Worse    What it looks like:   Depression is starting to interfere with your life.   It may be hard to get out of bed; you may be starting to isolate yourself from others.  Symptoms of depression are starting to last most all day and this has happened for several days.   You may have suicidal thoughts but they are not constant.   What you need to do:     Call your care team. Your response to treatment will improve if you keep your care team informed of your progress. Yellow periods are signs an adjustment may need to be made.     Continue your self-care.  Just get dressed and ready for the day.  Don't give yourself time to talk yourself out of it.    Talk to someone in your support network.    Open up your Depression Self-Care Plan/Wellness Kit.             RED    ZONE Medical Alert - Get Help    What it looks like:   Depression is seriously interfering with your life.   You may experience these or other symptoms: You can t get out of bed most days, can t work or engage in other necessary activities, you have trouble taking care of basic hygiene, or basic responsibilities, thoughts of suicide or death that will not go away,  self-injurious behavior.     What you need to do:  Call your care team and request a same-day appointment. If they are not available (weekends or after hours) call your local crisis line, emergency room or 911.          Depression Self-Care Plan / Wellness Kit    Many people find that medication and therapy are helpful treatments for managing depression. In addition, making small changes to your everyday life can help to boost your mood and improve your wellbeing. Below are some tips for you to consider. Be sure to talk with your medical provider and/or behavioral health consultant if your symptoms are worsening or not improving.     Sleep   Sleep hygiene  means all of the habits that support good, restful sleep. It includes maintaining a consistent bedtime and wake time, using your bedroom only for sleeping or sex, and keeping the bedroom dark and free of distractions like a computer, smartphone, or television.     Develop a Healthy Routine  Maintain good hygiene. Get out of bed in the morning, make your bed, brush your teeth, take a shower, and get dressed. Don t spend too much time viewing media that makes you feel stressed. Find time to relax each day.    Exercise  Get some form of exercise every day. This will help reduce pain and release endorphins, the  feel good  chemicals in your brain. It can be as simple as just going for a walk or doing some gardening, anything that will get you moving.      Diet  Strive to eat healthy foods, including fruits and vegetables. Drink plenty of water. Avoid excessive sugar, caffeine, alcohol, and other mood-altering substances.     Stay Connected with Others  Stay in touch with friends and family members.    Manage Your Mood  Try deep breathing, massage therapy, biofeedback, or meditation. Take part in fun activities when you can. Try to find something to smile about each day.     Psychotherapy  Be open to working with a therapist if your provider recommends it.      Medication  Be sure to take your medication as prescribed. Most anti-depressants need to be taken every day. It usually takes several weeks for medications to work. Not all medicines work for all people. It is important to follow-up with your provider to make sure you have a treatment plan that is working for you. Do not stop your medication abruptly without first discussing it with your provider.    Crisis Resources   These hotlines are for both adults and children. They and are open 24 hours a day, 7 days a week unless noted otherwise.    National Suicide Prevention Lifeline   988 or 7-942-751-HKBU (5954)    Crisis Text Line    www.crisistextline.org  Text HOME to 438153 from anywhere in the [place], anytime, about any type of crisis. A live, trained crisis counselor will receive the text and respond quickly.    [place] Lifeline for LGBTQ Youth  A national crisis intervention and suicide lifeline for LGBTQ youth under 25. Provides a safe place to talk without judgement. Call 1-108.783.9251; text START to 588408 or visit www.thetrevorproject.org to talk to a trained counselor.    For [facility] crisis numbers, visit the [facility] website at:  https://[place].gov/dhs/people-we-serve/adults/health-care/mental-health/resources/crisis-contacts.jsp

## 2023-04-14 NOTE — PROGRESS NOTES
"Mary is a 37 year old who is being evaluated via a billable telephone visit.      What phone number would you like to be contacted at? [phone]  How would you like to obtain your AVS? Mail a copy    Distant Location (provider location):  On-site    Assessment & Plan   Problem List Items Addressed This Visit        Digestive    Gastroparesis - Primary       Behavioral    Moderate episode of recurrent major depressive disorder (H)    Relevant Medications    FLUoxetine (PROZAC) 40 MG capsule      Patient has been out medication for two days. Patient states the fluoxetine works well. A refill has been provided.    Patient is being followed by gastroenterology indicated that she was prescribed some amitriptyline has not taken she will be seen by a new specialist next week encouraged her to discuss further.    Recommend the patient schedule annual physical exam and lab work establish care with a provider with an open practice by August 2023.  All patient's questions addressed verbalized understanding and agree with plan.           BMI:   Estimated body mass index is 34.81 kg/m  as calculated from the following:    Height as of 5/24/22: 1.638 m (5' 4.5\").    Weight as of 5/24/22: 93.4 kg (206 lb).           Tesha Adkins NP  [facility]    Subjective   Mary is a 37 year old, presenting for the following health issues:  Depression and Recheck Medication        4/14/2023     8:34 AM   Additional Questions   Roomed by Antonio BIRMINGHAM CMA     HPI     Depression and Anxiety Follow-Up    How are you doing with your depression since your last visit? No change    How are you doing with your anxiety since your last visit?  No change    Are you having other symptoms that might be associated with depression or anxiety? No    Have you had a significant life event? No     Do you have any concerns with your use of alcohol or other drugs? No    Social History     Tobacco Use     Smoking status: Former     " Packs/day: 1.00     Years: 20.00     Pack years: 20.00     Types: Cigarettes     Smokeless tobacco: Never     Tobacco comments:     Quit in 2020   Substance Use Topics     Alcohol use: No     Drug use: Yes     Types: Marijuana         5/24/2022     3:27 PM 8/31/2022     4:54 PM 4/14/2023     8:35 AM   PHQ   PHQ-9 Total Score 9 1 7   Q9: Thoughts of better off dead/self-harm past 2 weeks Not at all Not at all Not at all         5/24/2022     3:27 PM 4/14/2023     8:35 AM   FREYA-7 SCORE   Total Score 3 3         4/14/2023     8:35 AM   Last PHQ-9   1.  Little interest or pleasure in doing things 1   2.  Feeling down, depressed, or hopeless 1   3.  Trouble falling or staying asleep, or sleeping too much 1   4.  Feeling tired or having little energy 1   5.  Poor appetite or overeating 1   6.  Feeling bad about yourself 1   7.  Trouble concentrating 1   8.  Moving slowly or restless 0   Q9: Thoughts of better off dead/self-harm past 2 weeks 0   PHQ-9 Total Score 7   Difficulty at work, home, or with people Not difficult at all         4/14/2023     8:35 AM   FREYA-7    1. Feeling nervous, anxious, or on edge 0   2. Not being able to stop or control worrying 1   3. Worrying too much about different things 1   4. Trouble relaxing 0   5. Being so restless that it is hard to sit still 0   6. Becoming easily annoyed or irritable 1   7. Feeling afraid, as if something awful might happen 0   FREYA-7 Total Score 3   If you checked any problems, how difficult have they made it for you to do your work, take care of things at home, or get along with other people? Not difficult at all       Suicide Assessment Five-step Evaluation and Treatment (SAFE-T)      Review of Systems   Unremarkable other than listed above and below         Objective           Vitals:  No vitals were obtained today due to virtual visit.    Physical Exam   healthy, alert and no distress  PSYCH: Alert and oriented times 3; coherent speech, normal   rate and volume,  able to articulate logical thoughts, able   to abstract reason, no tangential thoughts, no hallucinations   or delusions  Her affect is normal  RESP: No cough, no audible wheezing, able to talk in full sentences  Remainder of exam unable to be completed due to telephone visits            Phone call duration: 9 minutes

## 2023-08-07 ENCOUNTER — VIRTUAL VISIT (OUTPATIENT)
Dept: FAMILY MEDICINE | Facility: CLINIC | Age: 38
End: 2023-08-07
Payer: COMMERCIAL

## 2023-08-07 DIAGNOSIS — E78.2 MIXED HYPERLIPIDEMIA: ICD-10-CM

## 2023-08-07 DIAGNOSIS — K31.84 GASTROPARESIS: ICD-10-CM

## 2023-08-07 DIAGNOSIS — K21.9 GASTROESOPHAGEAL REFLUX DISEASE, UNSPECIFIED WHETHER ESOPHAGITIS PRESENT: ICD-10-CM

## 2023-08-07 DIAGNOSIS — Z76.0 ENCOUNTER FOR MEDICATION REFILL: Primary | ICD-10-CM

## 2023-08-07 DIAGNOSIS — F33.1 MODERATE EPISODE OF RECURRENT MAJOR DEPRESSIVE DISORDER (H): ICD-10-CM

## 2023-08-07 PROCEDURE — 99214 OFFICE O/P EST MOD 30 MIN: CPT | Mod: 95 | Performed by: INTERNAL MEDICINE

## 2023-08-07 RX ORDER — POLYETHYLENE GLYCOL 3350 17 G/17G
1 POWDER, FOR SOLUTION ORAL DAILY
Qty: 850 G | Refills: 3 | Status: SHIPPED | OUTPATIENT
Start: 2023-08-07

## 2023-08-07 RX ORDER — FLUOXETINE 40 MG/1
40 CAPSULE ORAL DAILY
Qty: 90 CAPSULE | Refills: 3 | Status: SHIPPED | OUTPATIENT
Start: 2023-08-07 | End: 2024-08-26

## 2023-08-07 RX ORDER — ATORVASTATIN CALCIUM 10 MG/1
10 TABLET, FILM COATED ORAL DAILY
Qty: 90 TABLET | Refills: 3 | Status: SHIPPED | OUTPATIENT
Start: 2023-08-07 | End: 2024-08-26

## 2023-08-07 RX ORDER — OMEPRAZOLE 40 MG/1
40 CAPSULE, DELAYED RELEASE ORAL DAILY
COMMUNITY
End: 2023-08-07

## 2023-08-07 RX ORDER — OMEPRAZOLE 40 MG/1
40 CAPSULE, DELAYED RELEASE ORAL DAILY
Qty: 90 CAPSULE | Refills: 3 | Status: SHIPPED | OUTPATIENT
Start: 2023-08-07 | End: 2024-08-26

## 2023-08-07 ASSESSMENT — PATIENT HEALTH QUESTIONNAIRE - PHQ9: SUM OF ALL RESPONSES TO PHQ QUESTIONS 1-9: 7

## 2023-08-07 NOTE — PROGRESS NOTES
"Mary is a 38 year old who is being evaluated via a billable video visit.      How would you like to obtain your AVS? MyChart  If the video visit is dropped, the invitation should be resent by: Text to cell phone: [phone]  Will anyone else be joining your video visit? No      Subjective   Mary is a 38 year old, presenting for the following health issues:  Recheck Medication      HPI       Chief Complaint:     Medication refills    HPI:   Patient Mary Zelaya is a very pleasant 38 year old female with history of hyperlipidemia who presents to Internal Medicine clinic today via video visit for medication refills.        Current Medications:     Current Outpatient Medications   Medication Sig Dispense Refill     atorvastatin (LIPITOR) 10 MG tablet Take 1 tablet (10 mg) by mouth daily 90 tablet 3     FLUoxetine (PROZAC) 40 MG capsule Take 1 capsule (40 mg) by mouth daily 90 capsule 3     omeprazole (PRILOSEC) 40 MG DR capsule Take 1 capsule (40 mg) by mouth daily Take 40 mg by mouth daily 90 capsule 3     polyethylene glycol (MIRALAX) 17 GM/Dose powder Take 17 g (1 Capful) by mouth daily 850 g 3         Allergies:      Allergies   Allergen Reactions     Azithromycin Unknown     Cephalexin Monohydrate [Cephalexin] Unknown     Penicillins Unknown            Past Medical History:   No past medical history on file.      Past Surgical History:     Past Surgical History:   Procedure Laterality Date     ADENOIDECTOMY       TONSILLECTOMY       TYMPANOSTOMY           Family Medical History:     Family History   Problem Relation Age of Onset     Diabetes Type 2  Mother          age 58 of \"blood clot in intestine\"     Hyperlipidemia Mother      Diabetes Type 2  Father          age 68, put on hospice due to chronic conditions     Dementia Father      Chronic Obstructive Pulmonary Disease Father      Kidney failure Father      CABG Father      Atrial fibrillation Father      Cerebrovascular Disease Father  "         Social History:     Social History     Socioeconomic History     Marital status: Single     Spouse name: Not on file     Number of children: Not on file     Years of education: Not on file     Highest education level: Not on file   Occupational History     Not on file   Tobacco Use     Smoking status: Former     Packs/day: 1.00     Years: 20.00     Pack years: 20.00     Types: Cigarettes     Smokeless tobacco: Never     Tobacco comments:     Quit in 2020   Vaping Use     Vaping Use: Never used   Substance and Sexual Activity     Alcohol use: No     Drug use: Yes     Types: Marijuana     Sexual activity: Yes     Partners: Male     Birth control/protection: Surgical   Other Topics Concern     Not on file   Social History Narrative     Not on file     Social Determinants of Health     Financial Resource Strain: Not on file   Food Insecurity: Not on file   Transportation Needs: Not on file   Physical Activity: Not on file   Stress: Not on file   Social Connections: Not on file   Intimate Partner Violence: Not on file   Housing Stability: Not on file           Review of System:     Constitutional: Negative for fever or chills  Skin: Negative for rashes  Ears/Nose/Throat: Negative for nasal congestion, sore throat  Respiratory: No shortness of breath, dyspnea on exertion, cough, or hemoptysis  Cardiovascular: Negative for chest pain  Gastrointestinal: positive for gastroparesis  Genitourinary: Negative for dysuria, hematuria  Musculoskeletal: Negative for myalgias  Neurologic: Negative for headaches  Psychiatric: positive for depression, anxiety  Hematologic/Lymphatic/Immunologic: Negative  Endocrine: Negative  Behavioral: Negative for tobacco use       Physical Exam:   There were no vitals taken for this visit.    GENERAL: alert and no distress  EYES: eyes grossly normal to inspection  HENT: Normocephalic atraumatic.   RESP: no cough present  CV: patient appears to be hemodynamically stable  MS: no gross  musculoskeletal defects noted  SKIN: no visible suspicious lesions or rashes  NEURO: Alert & Oriented x 3.   PSYCH: mentation appears normal, affect normal        Diagnostic Test Results:     Diagnostic Test Results:  Labs reviewed in Epic    ASSESSMENT/PLAN:       Mary was seen today for recheck medication.    Diagnoses and all orders for this visit:    Encounter for medication refill    Moderate episode of recurrent major depressive disorder (H)  -     FLUoxetine (PROZAC) 40 MG capsule; Take 1 capsule (40 mg) by mouth daily    Mixed hyperlipidemia  -     atorvastatin (LIPITOR) 10 MG tablet; Take 1 tablet (10 mg) by mouth daily    Gastroesophageal reflux disease, unspecified whether esophagitis present  -     omeprazole (PRILOSEC) 40 MG DR capsule; Take 1 capsule (40 mg) by mouth daily Take 40 mg by mouth daily    Gastroparesis  -     polyethylene glycol (MIRALAX) 17 GM/Dose powder; Take 17 g (1 Capful) by mouth daily            Follow Up Plan:     Patient is instructed to return to Internal Medicine clinic for follow-up visit in 3 to 6 months.        Lizeth Clark MD  Internal Medicine  [facility]      Video-Visit Details    Type of service:  Video Visit   Video Start Time: 12:40 PM  Video End Time: 12:52 PM    Originating Location (pt. Location): Home    Distant Location (provider location):  Off-site  Platform used for Video Visit: Daniele

## 2024-08-26 ENCOUNTER — VIRTUAL VISIT (OUTPATIENT)
Dept: FAMILY MEDICINE | Facility: CLINIC | Age: 39
End: 2024-08-26
Payer: COMMERCIAL

## 2024-08-26 DIAGNOSIS — K21.9 GASTROESOPHAGEAL REFLUX DISEASE, UNSPECIFIED WHETHER ESOPHAGITIS PRESENT: ICD-10-CM

## 2024-08-26 DIAGNOSIS — K58.9 IRRITABLE BOWEL SYNDROME, UNSPECIFIED TYPE: Primary | ICD-10-CM

## 2024-08-26 DIAGNOSIS — F33.1 MODERATE EPISODE OF RECURRENT MAJOR DEPRESSIVE DISORDER (H): ICD-10-CM

## 2024-08-26 DIAGNOSIS — E78.2 MIXED HYPERLIPIDEMIA: ICD-10-CM

## 2024-08-26 PROCEDURE — 99443 PR PHYSICIAN TELEPHONE EVALUATION 21-30 MIN: CPT | Mod: 95 | Performed by: INTERNAL MEDICINE

## 2024-08-26 RX ORDER — ONDANSETRON 4 MG/1
TABLET, ORALLY DISINTEGRATING ORAL
COMMUNITY
Start: 2024-01-17

## 2024-08-26 RX ORDER — OMEPRAZOLE 40 MG/1
40 CAPSULE, DELAYED RELEASE ORAL DAILY
Qty: 90 CAPSULE | Refills: 3 | Status: SHIPPED | OUTPATIENT
Start: 2024-08-26

## 2024-08-26 RX ORDER — FLUOXETINE 40 MG/1
40 CAPSULE ORAL DAILY
Qty: 90 CAPSULE | Refills: 3 | Status: SHIPPED | OUTPATIENT
Start: 2024-08-26

## 2024-08-26 RX ORDER — LINACLOTIDE 145 UG/1
CAPSULE, GELATIN COATED ORAL
Status: CANCELLED | OUTPATIENT
Start: 2024-08-26

## 2024-08-26 RX ORDER — ATORVASTATIN CALCIUM 10 MG/1
10 TABLET, FILM COATED ORAL DAILY
Qty: 90 TABLET | Refills: 3 | Status: SHIPPED | OUTPATIENT
Start: 2024-08-26

## 2024-08-26 RX ORDER — LINACLOTIDE 145 UG/1
CAPSULE, GELATIN COATED ORAL
COMMUNITY
Start: 2024-08-14

## 2024-08-26 ASSESSMENT — PATIENT HEALTH QUESTIONNAIRE - PHQ9
SUM OF ALL RESPONSES TO PHQ QUESTIONS 1-9: 2
SUM OF ALL RESPONSES TO PHQ QUESTIONS 1-9: 2
10. IF YOU CHECKED OFF ANY PROBLEMS, HOW DIFFICULT HAVE THESE PROBLEMS MADE IT FOR YOU TO DO YOUR WORK, TAKE CARE OF THINGS AT HOME, OR GET ALONG WITH OTHER PEOPLE: NOT DIFFICULT AT ALL

## 2024-08-26 NOTE — PROGRESS NOTES
Mary is a 39 year old who is being evaluated via a billable telephone visit.    How would you like to obtain your AVS? Mail a copy  Originating Location (pt. Location): Home    Distant Location (provider location):  Off-site        Subjective   Mary is a 39 year old, presenting for the following health issues:  Recheck Medication and Follow Up        8/26/2024     2:06 PM   Additional Questions   Roomed by Philly MAIN           History of Present Illness       Reason for visit:  Med recheck    She eats 2-3 servings of fruits and vegetables daily.She consumes 2 sweetened beverage(s) daily.She exercises with enough effort to increase her heart rate 30 to 60 minutes per day.  She exercises with enough effort to increase her heart rate 3 or less days per week.   She is taking medications regularly.       Medication Followup of Atorvastatin   Taking Medication as prescribed: yes  Side Effects:  None  Medication Helping Symptoms:  yes    Medication Followup of Linzess  Taking Medication as prescribed: yes  Side Effects:  None  Medication Helping Symptoms:  yes    Medication Followup of Omeprazole   Taking Medication as prescribed: yes  Side Effects:  None  Medication Helping Symptoms:  yes    Medication Followup of Fluoxetine   Taking Medication as prescribed: yes  Side Effects:  None  Medication Helping Symptoms:  yes      Current Medications:     Current Outpatient Medications   Medication Sig Dispense Refill    atorvastatin (LIPITOR) 10 MG tablet Take 1 tablet (10 mg) by mouth daily. 90 tablet 3    FLUoxetine (PROZAC) 40 MG capsule Take 1 capsule (40 mg) by mouth daily. 90 capsule 3    LINZESS 145 MCG capsule       omeprazole (PRILOSEC) 40 MG DR capsule Take 1 capsule (40 mg) by mouth daily. Take 40 mg by mouth daily 90 capsule 3    ondansetron (ZOFRAN ODT) 4 MG ODT tab PLACE 1 TABLET ON THE TONGUE EVERY 6 HOURS AS NEEDED. PLACE ON TOP OF THE TONGUE WHERE THEY WILL DISSOLVE THEN SWALLOW      polyethylene glycol  "(MIRALAX) 17 GM/Dose powder Take 17 g (1 Capful) by mouth daily (Patient not taking: Reported on 2024) 850 g 3         Allergies:      Allergies   Allergen Reactions    Azithromycin Unknown and Anaphylaxis     pt states swelling and loss of circulation to extremities    Cephalexin Unknown    Penicillins Unknown            Past Medical History:   No past medical history on file.      Past Surgical History:     Past Surgical History:   Procedure Laterality Date    ADENOIDECTOMY      TONSILLECTOMY      TYMPANOSTOMY           Family Medical History:     Family History   Problem Relation Age of Onset    Diabetes Type 2  Mother          age 58 of \"blood clot in intestine\"    Hyperlipidemia Mother     Diabetes Type 2  Father          age 68, put on hospice due to chronic conditions    Dementia Father     Chronic Obstructive Pulmonary Disease Father     Kidney failure Father     CABG Father     Atrial fibrillation Father     Cerebrovascular Disease Father          Social History:     Social History     Socioeconomic History    Marital status: Single     Spouse name: Not on file    Number of children: Not on file    Years of education: Not on file    Highest education level: Not on file   Occupational History    Not on file   Tobacco Use    Smoking status: Former     Current packs/day: 1.00     Average packs/day: 1 pack/day for 20.0 years (20.0 ttl pk-yrs)     Types: Cigarettes    Smokeless tobacco: Never    Tobacco comments:     Quit in    Vaping Use    Vaping status: Never Used   Substance and Sexual Activity    Alcohol use: No    Drug use: Yes     Types: Marijuana    Sexual activity: Yes     Partners: Male     Birth control/protection: Surgical   Other Topics Concern    Not on file   Social History Narrative    Not on file     Social Determinants of Health     Financial Resource Strain: Not on file   Food Insecurity: Not on file   Transportation Needs: Not on file   Physical Activity: Not on file "   Stress: Not on file   Social Connections: Not on file   Interpersonal Safety: Not on file   Housing Stability: Not on file           Review of System:     Constitutional: Negative for fever or chills  Skin: Negative for rashes  Ears/Nose/Throat: Negative for nasal congestion, sore throat  Respiratory: No shortness of breath, dyspnea on exertion, cough, or hemoptysis  Cardiovascular: Negative for chest pain  Gastrointestinal: positive for GERD, IBS symptoms  Genitourinary: Negative for dysuria, hematuria  Musculoskeletal: Negative for myalgias  Neurologic: Negative for headaches  Psychiatric: positive for depression, anxiety  Hematologic/Lymphatic/Immunologic: Negative  Endocrine: Negative  Behavioral: Negative for tobacco use       Physical Exam:   There were no vitals taken for this visit.    RESP: no cough present   NEURO: Alert & Oriented x 3.   PSYCH: mentation appears normal, affect normal        Diagnostic Test Results:     Diagnostic Test Results:  Labs reviewed in Epic    ASSESSMENT/PLAN:       Mary was seen today for recheck medication and follow up.    Diagnoses and all orders for this visit:    Mixed hyperlipidemia  -   stable  - medication refilled today for  atorvastatin (LIPITOR) 10 MG tablet; Take 1 tablet (10 mg) by mouth daily.    Gastroesophageal reflux disease, unspecified whether esophagitis present  -   stable  - medication refilled today for    omeprazole (PRILOSEC) 40 MG DR capsule; Take 1 capsule (40 mg) by mouth daily. Take 40 mg by mouth daily    Moderate episode of recurrent major depressive disorder (H)  -   stable  - medication refilled today for    FLUoxetine (PROZAC) 40 MG capsule; Take 1 capsule (40 mg) by mouth daily.      IBS  - stable, continue current therapy      Follow Up Plan:     Patient is instructed to return to Internal Medicine clinic for follow-up visit in 1 year.        Lizeth Clark MD  Internal Medicine  [facility]    Telephone Visit duration  including chart review medication management: 24 minutes      Signed Electronically by: Lizeth Clark MD